# Patient Record
Sex: FEMALE | Race: WHITE | URBAN - METROPOLITAN AREA
[De-identification: names, ages, dates, MRNs, and addresses within clinical notes are randomized per-mention and may not be internally consistent; named-entity substitution may affect disease eponyms.]

---

## 2017-09-11 ENCOUNTER — ALLIED HEALTH/NURSE VISIT (OUTPATIENT)
Dept: OPHTHALMOLOGY | Facility: CLINIC | Age: 22
End: 2017-09-11
Attending: OPHTHALMOLOGY
Payer: COMMERCIAL

## 2017-09-11 ENCOUNTER — APPOINTMENT (OUTPATIENT)
Dept: OPHTHALMOLOGY | Facility: CLINIC | Age: 22
End: 2017-09-11
Payer: COMMERCIAL

## 2017-09-11 DIAGNOSIS — Z79.899 LONG-TERM USE OF PLAQUENIL: Primary | ICD-10-CM

## 2017-09-11 PROCEDURE — 92083 EXTENDED VISUAL FIELD XM: CPT | Mod: ZF

## 2017-09-11 PROCEDURE — 40000269 ZZH STATISTIC NO CHARGE FACILITY FEE: Mod: ZF

## 2017-09-11 RX ORDER — LEVOTHYROXINE SODIUM 25 UG/1
37.5 CAPSULE ORAL
COMMUNITY

## 2017-09-11 ASSESSMENT — VISUAL ACUITY
OS_CC: 20/20
OS_CC+: -3
OD_CC: 20/25
OD_CC+: -1
METHOD: SNELLEN - LINEAR

## 2017-09-11 NOTE — PROGRESS NOTES
Assessment & Plan     Dasha Cordero is a 22 year old female with the following diagnoses:   1. Long-term use of Plaquenil       The patient underwent a 10 degree visual field.  The visual field in the RIGHT eye is normal.  The LEFT eye shows nonspecific changes.  It is not consistent with plaquenil toxicity.  Clinical correlation advised.          Attending Physician Attestation:  Complete documentation of historical and exam elements from today's encounter can be found in the full encounter summary report (not reduplicated in this progress note).  I personally obtained the chief complaint(s) and history of present illness.  I confirmed and edited as necessary the review of systems, past medical/surgical history, family history, social history, and examination findings as documented by others; and I examined the patient myself.  I personally reviewed the relevant tests, images, and reports as documented above.  I formulated and edited as necessary the assessment and plan and discussed the findings and management plan with the patient and family. - Tacos Cook MD

## 2017-09-11 NOTE — LETTER
2017         RE:  :  MRN: Dasha Cordero  1995  [id number]     Dear Dr. Thompson,    Your patient came for a visual field only.  She was not examined.          Assessment & Plan     Dasha Cordero is a 22 year old female with the following diagnoses:   1. Long-term use of Plaquenil       The patient underwent a 10 degree visual field.  The visual field in the RIGHT eye is normal.  The LEFT eye shows nonspecific changes.  It is not consistent with plaquenil toxicity.  Clinical correlation advised.         Again, thank you for allowing me to participate in the care of your patient.      Sincerely,    Tacos Cook MD  Professor, Neuro-Ophthalmology  Department of Ophthalmology and Visual Neurosciences  [facility]

## 2017-09-11 NOTE — MR AVS SNAPSHOT
After Visit Summary   2017    Dasha Cordero    MRN: [id number]           Patient Information     Date Of Birth          1995        Visit Information        Provider Department      2017 10:00 AM [facility]        Today's Diagnoses     Long-term use of Plaquenil    -  1       Follow-ups after your visit        Who to contact     Please call your clinic at [phone] to:    Ask questions about your health    Make or cancel appointments    Discuss your medicines    Learn about your test results    Speak to your doctor   If you have compliments or concerns about an experience at your clinic, or if you wish to file a complaint, please contact [facility] Patient Relations at [phone] or email us at [email or web address]         Additional Information About Your Visit        MyChart Information     Lore is an electronic gateway that provides easy, online access to your medical records. With Lore, you can request a clinic appointment, read your test results, renew a prescription or communicate with your care team.     To sign up for [employer] visit the website at [email or web address]   You will be asked to enter the access code listed below, as well as some personal information. Please follow the directions to create your username and password.     Your access code is: [email or web address]  Expires: 2017  6:30 AM     Your access code will  in 90 days. If you need help or a new code, please contact your [facility] Clinic or call [phone] for assistance.        Care EveryWhere ID     This is your Care EveryWhere ID. This could be used by other organizations to access your [facility] medical records  [id number]         Blood Pressure from Last 3 Encounters:   No data found for BP    Weight from Last 3 Encounters:   No data found for Wt              We Performed the Following     Macula Top OU         Primary Care Provider    None Specified       No primary provider on file.        Equal Access to Services     VINCE WHITE : Hadii aad ku hadjuliomaliha Delarosa, beth reid, denny sandovalmaestrella dahl. So [facility] [phone].    ATENCIÓN: Si habla español, tiene a crisostomo disposición servicios gratuitos de asistencia lingüística. Llame al [phone].    We comply with applicable federal civil rights laws and [place] laws. We do not discriminate on the basis of race, color, national origin, age, disability sex, sexual orientation or gender identity.            Thank you!     Thank you for choosing EYE CLINIC  for your care. Our goal is always to provide you with excellent care. Hearing back from our patients is one way we can continue to improve our services. Please take a few minutes to complete the written survey that you may receive in the mail after your visit with us. Thank you!             Your Updated Medication List - Protect others around you: Learn how to safely use, store and throw away your medicines at www.disposemymeds.org.          This list is accurate as of: 9/11/17  3:52 PM.  Always use your most recent med list.                   Brand Name Dispense Instructions for use Diagnosis    ATOVAQUONE-PROGUANIL HCL PO      Take 4 tablets by mouth daily        Levothyroxine Sodium 25 MCG Caps      Take 37.5 mg by mouth        MINOCYCLINE HCL PO           ZITHROMAX PO

## 2018-01-21 ENCOUNTER — HEALTH MAINTENANCE LETTER (OUTPATIENT)
Age: 23
End: 2018-01-21

## 2020-03-11 ENCOUNTER — HEALTH MAINTENANCE LETTER (OUTPATIENT)
Age: 25
End: 2020-03-11

## 2020-12-27 ENCOUNTER — HEALTH MAINTENANCE LETTER (OUTPATIENT)
Age: 25
End: 2020-12-27

## 2021-04-25 ENCOUNTER — HEALTH MAINTENANCE LETTER (OUTPATIENT)
Age: 26
End: 2021-04-25

## 2021-10-09 ENCOUNTER — HEALTH MAINTENANCE LETTER (OUTPATIENT)
Age: 26
End: 2021-10-09

## 2022-05-21 ENCOUNTER — HEALTH MAINTENANCE LETTER (OUTPATIENT)
Age: 27
End: 2022-05-21

## 2022-09-17 ENCOUNTER — HEALTH MAINTENANCE LETTER (OUTPATIENT)
Age: 27
End: 2022-09-17

## 2023-06-04 ENCOUNTER — HEALTH MAINTENANCE LETTER (OUTPATIENT)
Age: 28
End: 2023-06-04